# Patient Record
Sex: FEMALE | Race: BLACK OR AFRICAN AMERICAN | NOT HISPANIC OR LATINO | URBAN - METROPOLITAN AREA
[De-identification: names, ages, dates, MRNs, and addresses within clinical notes are randomized per-mention and may not be internally consistent; named-entity substitution may affect disease eponyms.]

---

## 2022-04-03 ENCOUNTER — EMERGENCY (EMERGENCY)
Facility: HOSPITAL | Age: 37
LOS: 1 days | Discharge: HOME | End: 2022-04-03
Attending: EMERGENCY MEDICINE
Payer: COMMERCIAL

## 2022-04-03 VITALS
TEMPERATURE: 98 F | WEIGHT: 240.08 LBS | DIASTOLIC BLOOD PRESSURE: 83 MMHG | HEART RATE: 97 BPM | SYSTOLIC BLOOD PRESSURE: 134 MMHG | RESPIRATION RATE: 20 BRPM | OXYGEN SATURATION: 98 %

## 2022-04-03 LAB
RAPID RVP RESULT: DETECTED
RV+EV RNA SPEC QL NAA+PROBE: DETECTED
SARS-COV-2 RNA SPEC QL NAA+PROBE: SIGNIFICANT CHANGE UP

## 2022-04-03 PROCEDURE — 99284 EMERGENCY DEPT VISIT MOD MDM: CPT

## 2022-04-03 PROCEDURE — 99283 EMERGENCY DEPT VISIT LOW MDM: CPT | Mod: 25

## 2022-04-03 PROCEDURE — 71046 X-RAY EXAM CHEST 2 VIEWS: CPT | Mod: 26

## 2022-04-03 PROCEDURE — 0225U NFCT DS DNA&RNA 21 SARSCOV2: CPT

## 2022-04-03 PROCEDURE — 71046 X-RAY EXAM CHEST 2 VIEWS: CPT

## 2022-04-03 NOTE — ED ADULT NURSE NOTE - NSIMPLEMENTINTERV_GEN_ALL_ED
Implemented All Universal Safety Interventions:  [place] to call system. Call bell, personal items and telephone within reach. Instruct patient to call for assistance. Room bathroom lighting operational. Non-slip footwear when patient is off stretcher. Physically safe environment: no spills, clutter or unnecessary equipment. Stretcher in lowest position, wheels locked, appropriate side rails in place.

## 2022-04-03 NOTE — ED PROVIDER NOTE - PATIENT PORTAL LINK FT
You can access the FollowMyHealth Patient Portal offered by [facility] by registering at the following website: [email or web address]. By joining [email or web address]’s FollowMyHealth portal, you will also be able to view your health information using other applications (apps) compatible with our system. You can access the FollowMyHealth Patient Portal offered by [facility] by registering at the following website: [email or web address]. By joining [employer]’s FollowMyHealth portal, you will also be able to view your health information using other applications (apps) compatible with our system.

## 2022-04-03 NOTE — ED PROVIDER NOTE - OBJECTIVE STATEMENT
36 year old female no past history presents for subjective fevers, chills, productive cough x2 days, worse last night.  Denies cp, sob.

## 2022-04-03 NOTE — ED PROVIDER NOTE - ATTENDING APP SHARED VISIT CONTRIBUTION OF CARE
Patient presents with URI.  Well appearing.  Patient offered CXR, initially declined, then accepted.  Will do RVP, CXR and discharge.  Return precautions provided.

## 2022-04-03 NOTE — ED PROVIDER NOTE - CLINICAL SUMMARY MEDICAL DECISION MAKING FREE TEXT BOX
Patient presents with URI.  Well appearing.  Patient offered CXR but declined.  Will do RVP and discharge.  Return precautions provided. Patient presents with URI.  Well appearing.  Patient offered CXR, initially declined, then accepted.  Will do RVP, CXR and discharge.  Return precautions provided.

## 2022-04-03 NOTE — ED ADULT NURSE NOTE - SUICIDE SCREENING QUESTION 3
CC:  Ivan Gold is here today for follow up on PPI med .  Symptoms:  none  Medications: medications verified and updated  Refills needed today?  NO  denies known Latex allergy or symptoms of Latex sensitivity.  Patient would like communication of their results via:      Cell Phone:   Telephone Information:   Mobile [phone]     Okay to leave a message containing results? Yes    Family History of any GI Disorders:    History of GERD: NO  History of Colon cancer: NO  History of Crohn's: NO  History of Ulcerative Colitis or Crohn's: NO  History of IBS: NO  History of Celiac: NO  History of other GI related illness or cancers such as pancreatic cancer, gallbladder problems, liver disease? NO   No

## 2022-04-03 NOTE — ED ADULT NURSE NOTE - CHIEF COMPLAINT QUOTE
Patient called stating he was having issues with his BP medication  He reported side effects of not being able to breathe, dizziness and a cough that made him feel like he was going to pass out  He said he did not take the medication today and he feels better without it   Patient would like a call back as soon as possible, his call back number is [phone]
productive cough , chills and fever,

## 2022-04-03 NOTE — ED PROVIDER NOTE - NS ED ATTENDING STATEMENT MOD
This was a shared visit with the DINAH. I reviewed and verified the documentation and independently performed the documented:

## 2024-06-15 PROBLEM — Z78.9 OTHER SPECIFIED HEALTH STATUS: Chronic | Status: ACTIVE | Noted: 2022-04-03

## 2024-06-15 PROBLEM — Z00.00 ENCOUNTER FOR PREVENTIVE HEALTH EXAMINATION: Status: ACTIVE | Noted: 2024-06-15

## 2024-06-17 ENCOUNTER — APPOINTMENT (OUTPATIENT)
Dept: SURGERY | Facility: CLINIC | Age: 39
End: 2024-06-17
Payer: COMMERCIAL

## 2024-06-17 VITALS
DIASTOLIC BLOOD PRESSURE: 75 MMHG | BODY MASS INDEX: 41.65 KG/M2 | HEIGHT: 65 IN | WEIGHT: 250 LBS | HEART RATE: 123 BPM | SYSTOLIC BLOOD PRESSURE: 99 MMHG

## 2024-06-17 DIAGNOSIS — R11.2 NAUSEA WITH VOMITING, UNSPECIFIED: ICD-10-CM

## 2024-06-17 DIAGNOSIS — F32.A DEPRESSION, UNSPECIFIED: ICD-10-CM

## 2024-06-17 DIAGNOSIS — Z78.9 OTHER SPECIFIED HEALTH STATUS: ICD-10-CM

## 2024-06-17 DIAGNOSIS — R10.9 UNSPECIFIED ABDOMINAL PAIN: ICD-10-CM

## 2024-06-17 DIAGNOSIS — K82.8 OTHER SPECIFIED DISEASES OF GALLBLADDER: ICD-10-CM

## 2024-06-17 DIAGNOSIS — F41.9 ANXIETY DISORDER, UNSPECIFIED: ICD-10-CM

## 2024-06-17 PROCEDURE — 99203 OFFICE O/P NEW LOW 30 MIN: CPT

## 2024-06-17 RX ORDER — ONDANSETRON 4 MG/1
4 TABLET, ORALLY DISINTEGRATING ORAL EVERY 8 HOURS
Qty: 21 | Refills: 0 | Status: ACTIVE | COMMUNITY
Start: 2024-06-17 | End: 1900-01-01

## 2024-06-17 NOTE — CONSULT LETTER
[Dear  ___] : Dear  [unfilled], [Consult Letter:] : I had the pleasure of evaluating your patient, [unfilled]. [Consult Closing:] : Thank you very much for allowing me to participate in the care of this patient.  If you have any questions, please do not hesitate to contact me. [Sincerely,] : Sincerely, [FreeTextEntry3] : Wes Cruz MD

## 2024-06-17 NOTE — PHYSICAL EXAM
[No HSM] : no hepatosplenomegaly [No Rash or Lesion] : No rash or lesion [Alert] : alert [Oriented to Person] : oriented to person [Oriented to Place] : oriented to place [Oriented to Time] : oriented to time [Calm] : calm [Abdominal Masses] : No abdominal masses [de-identified] : A/Ox3; NAD. appears comfortable [de-identified] : EOMI; sclera anicteric. [de-identified] : no cervical lymphadenopathy  [de-identified] : airway patent, no use of accessory muscles [de-identified] : abd is soft, +tenderness to the upper abdomen  [de-identified] : +ROM, normal gait

## 2024-06-17 NOTE — ASSESSMENT
[FreeTextEntry1] : IMP: 39 yo F who presents w severe abdominal discomfort, a/w nausea and vomiting. Pt w decreased appetite, since last week and last BM, Wednesday.  Abdominal US 06/12-- gallbladder sludge, without evidence of cholelithiasis or acute cholecystitis. Negative Montero's. Labs /LFTs within normal limits.

## 2024-06-17 NOTE — PLAN
[FreeTextEntry1] : Pt's symptoms out of proportion to physical exam /US findings  Will order CT A/P, STAT Follow up after the study   Patient's questions and concerns addressed to their satisfaction, and patient verbalized an understanding of the information discussed.

## 2024-06-17 NOTE — REVIEW OF SYSTEMS
[Abdominal Pain] : abdominal pain [Vomiting] : vomiting [Fever] : no fever [Chills] : no chills [Chest Pain] : no chest pain [Lower Ext Edema] : no lower extremity edema [Shortness Of Breath] : no shortness of breath [Skin Lesions] : no skin lesions [Skin Wound] : no skin wound [Dizziness] : no dizziness [Anxiety] : no anxiety [Muscle Weakness] : no muscle weakness [Swollen Glands] : no swollen glands [FreeTextEntry7] : no BM since last week; and difficulty eating; nausea

## 2024-06-17 NOTE — HISTORY OF PRESENT ILLNESS
[de-identified] : DUSTIN BANEGAS is a 38 year old F w/o significant PMH, who presents w the cc of having severe abdominal discomfort, with nausea and vomiting. Pt states she has not been eating since Monday and last BM was Wednesday, last week. Pt had gone to the ED at [facility], 06/12 and had an abdominal US which had showed gallbladder sludge without evidence of cholelithiasis or acute cholecystitis. Negative Montero's. LFTs/Labs, within normal limits.  Presently she has a lot of discomfort to the upper abdomen. Pt has been experiencing symptoms for some time, but recently has gotten worse.  PSH: 2 C-Sections; R Shoulder and R Knee Surgery

## 2024-06-18 ENCOUNTER — RESULT REVIEW (OUTPATIENT)
Age: 39
End: 2024-06-18

## 2024-06-21 ENCOUNTER — APPOINTMENT (OUTPATIENT)
Dept: CT IMAGING | Facility: CLINIC | Age: 39
End: 2024-06-21
Payer: COMMERCIAL

## 2024-06-21 DIAGNOSIS — K57.92 DIVERTICULITIS OF INTESTINE, PART UNSPECIFIED, W/OUT PERFORATION OR ABSCESS W/OUT BLEEDING: ICD-10-CM

## 2024-06-21 PROCEDURE — 74177 CT ABD & PELVIS W/CONTRAST: CPT

## 2024-06-21 RX ORDER — AMOXICILLIN AND CLAVULANATE POTASSIUM 875; 125 MG/1; MG/1
875-125 TABLET, COATED ORAL
Qty: 20 | Refills: 0 | Status: ACTIVE | COMMUNITY
Start: 2024-06-21 | End: 1900-01-01

## 2024-07-08 ENCOUNTER — APPOINTMENT (OUTPATIENT)
Dept: SURGERY | Facility: CLINIC | Age: 39
End: 2024-07-08

## 2025-01-15 NOTE — ED ADULT TRIAGE NOTE - HEART RATE (BEATS/MIN)
"[facility]  Hospitalist Discharge Summary      Date of Admission:  1/13/2025  Date of Discharge:  1/15/2025 10:47 AM  Discharging Provider: Dinorah Berrios MD  Discharge Service: Hospitalist Service, GOLD TEAM 22    Discharge Diagnoses   Acute alcohol withdrawal  Alcohol use disorder with history of DT  AGMA  Hyponatremia  Recurrent falls  Recent positive blood cx (Staphylococcus epidermidis, micrococcus)    History of COPD  Bipolar disorder  PTSD  Insomnia GERD  Hypothyroidism  Osteoarthritis  Hepatitis, treated    Clinically Significant Risk Factors     # Overweight: Estimated body mass index is 25.69 kg/m  as calculated from the following:    Height as of 1/9/25: 1.6 m (5' 3\").    Weight as of this encounter: 65.8 kg (145 lb).       Follow-ups Needed After Discharge   Follow-up Appointments       Adult [facility] Follow-up and recommended labs and tests      Follow up with intensive inpatient treatment for alcohol and mental health on Monday as already planned.     Appointments on [place] and/or [facility] (with [facility] or [facility] provider or service). Call [phone] if you haven't heard regarding these appointments within 7 days of discharge.              Unresulted Labs Ordered in the Past 30 Days of this Admission       Date and Time Order Name Status Description    1/13/2025  2:56 PM Blood Culture Peripheral Blood Preliminary     1/13/2025  2:56 PM Blood Culture Hand, Right Preliminary             Discharge Disposition   Discharged to home  Condition at discharge: Stable    Hospital Course   Jolynn Rivera is a 63 year old female with medical history significant for alcohol use disorder with prior withdrawal seizures, bipolar disorder, COPD, GERD, hepatitis C s/p treatment, chronic bilateral back and hip pain admitted on 1/13/2025 for alcohol withdrawal. Presented w/ LEOBARDO 0.197. Last drink ~8H prior to admission. Drinking about 1.5L wine per day. H/o alcohol " withdrawal seizures, but no seizure activity on presentation.  Labs were notable for anion gap metabolic acidosis and hyponatremia likely secondary to poor p.o. intake, improved after hydration.  She was placed on CIWA protocol with diazepam.  At the time of discharge she denied any chest pain, shortness of breath, abdominal pain, nausea, vomiting, tremors.  She was ambulating without concerns.  -Continue online treatment program  -Continue plans for inpatient treatment starting on Monday as already established  -Continue all prior to admission medications      Recurrent falls  Noted during 1/9 encounter. Head CT at the time normal. No recent falls reported by patient.  Seen by PT inpatient, felt to be at baseline.  Safe to discharge home.     Recent positive blood cx (Staphylococcus epidermidis, micrococcus), likely a contaminant  Seen in ED on 1/9 for alcohol withdrawal and alcohol withdrawal seizure. Blood cx drawn positive on day 1 of incubation w/ Staph epi and micrococcus species in 1 of 2 bottles. Likely contaminant. No infectious symptoms this admission.  Patient afebrile and hemodynamically stable.  Repeat blood cultures 01/13 no growth to date.  -Follow-up repeat 01/13 blood cultures, no growth after 12 hours     History of COPD  - Continue PTA inhalers ordered as fluticasone-vilanterol and umeclidinium     Bipolar disorder  PTSD  Insomnia  - PTA propranolol 20 mg TID  - PTA gabapentin 900 mg TID  - PTA Lunesta 3 mg nightly     GERD: PTA omeprazole  Hypothyroidism: PTA levothyroxine 100mcg daily  Osteoarthritis of multiple joints: PTA gabapentin helps, PRN apap.  Follow-up with primary care doctor for further evaluation if needed.  Hepatitis, treated.    Consultations This Hospital Stay   CARE MANAGEMENT / SOCIAL WORK IP CONSULT  PHYSICAL THERAPY ADULT IP CONSULT  CARE MANAGEMENT / SOCIAL WORK IP CONSULT  WOUND OSTOMY CONTINENCE NURSE  IP CONSULT    Code Status   Prior    Time Spent on this Encounter   I,  Dinorah Berrios MD, personally saw the patient today and spent greater than 30 minutes discharging this patient.       Dinorah Berrios MD  [facility] MED SURG  [address] [address]  Phone: [phone]  Fax: [phone]  ______________________________________________________________________    Physical Exam   Vital Signs: Temp: 97.8  F (36.6  C) Temp src: Oral BP: 116/77 Pulse: 71   Resp: 18 SpO2: 95 % O2 Device: None (Room air)    Weight: 145 lbs 0 oz    General Appearance:  Awake, Alert, Oriented, No acute distress, Nontoxic appearing   Respiratory: Breathing comfortably on room air. Lungs are clear to auscultation bilaterally.    Cardiovascular: Regular rate and rhythm, extremities perfused.   GI: Normal bowel sounds, Soft, nontender, nondistended  Extremities: No lower extremity edema.  No tremors.  Neurology: AOX3, moving all extremities appropriately.       Primary Care Physician   Carmela Srinivasan MD    Discharge Orders      Reason for your hospital stay    You were in the hospital for alcohol withdrawal.  You are feeling much better and are ready to go home.     Activity    Your activity upon discharge: activity as tolerated     Adult [facility] Follow-up and recommended labs and tests    Follow up with intensive inpatient treatment for alcohol and mental health on Monday as already planned.     Appointments on [place] and/or [facility] (with [facility] or [facility] provider or service). Call [phone] if you haven't heard regarding these appointments within 7 days of discharge.     Diet    Follow this diet upon discharge: Current Diet:Orders Placed This Encounter      Combination Diet Regular Diet Adult       Significant Results and Procedures   Most Recent 3 CBC's:  Recent Labs   Lab Test 01/14/25  0537 01/13/25  1506 01/09/25  0203   WBC 5.4 5.5 5.7   HGB 12.7 15.1 14.9   MCV 97 96 92    239 195     Most Recent 3 BMP's:  Recent Labs   Lab Test 01/14/25  0537  01/13/25  1506 01/09/25  0730   * 137 132*   POTASSIUM 3.6 4.1 3.5   CHLORIDE 96* 96* 91*   CO2 25 19* 23   BUN 8.8 5.0* 5.1*   CR 0.57 0.51 0.49*   ANIONGAP 13 22* 18*   ELVIRA 8.3* 8.8 8.5*   * 113* 166*     Most Recent 2 LFT's:  Recent Labs   Lab Test 01/13/25  1506 01/09/25  0203   AST 35 37   ALT 24 27   ALKPHOS 59 57   BILITOTAL 0.3 0.4     Most Recent 3 INR's:  Recent Labs   Lab Test 11/17/23  2133   INR 0.97     Most Recent INR's and Anticoagulation Dosing History:  Anticoagulation Dose History  More data may exist         Latest Ref Rng & Units 7/9/2013 10/22/2014 10/24/2014 12/2/2014 5/14/2015 9/3/2016 11/17/2023   Recent Dosing and Labs   INR 0.85 - 1.15 0.96  0.95  0.94  0.97  1.06  1.00  0.97      Most Recent 3 Creatinines:  Recent Labs   Lab Test 01/14/25  0537 01/13/25  1506 01/09/25  0730   CR 0.57 0.51 0.49*     Most Recent 3 Hemoglobins:  Recent Labs   Lab Test 01/14/25  0537 01/13/25  1506 01/09/25  0203   HGB 12.7 15.1 14.9     Most Recent 3 Troponin's:No lab results found.  Most Recent 3 BNP's:No lab results found.  Most Recent D-dimer:  Recent Labs   Lab Test 12/02/24  1315   DD 1.14*     Most Recent Cholesterol Panel:  Recent Labs   Lab Test 04/05/24  0749   CHOL 175   LDL 54      TRIG 65     7-Day Micro Results       Collected Updated Procedure Result Status      01/13/2025 1506 01/15/2025 1731 Blood Culture Hand, Right [[id number]]   Blood from Hand, Right    Preliminary result Component Value   Culture No growth after 2 days  [P]                01/13/2025 1506 01/15/2025 1731 Blood Culture Peripheral Blood [[id number]]   Peripheral Blood    Preliminary result Component Value   Culture No growth after 2 days  [P]                01/09/2025 0645 01/13/2025 0823 Blood Culture Peripheral Blood [[id number]]   (Abnormal)   Peripheral Blood    Final result Component Value   Culture Positive on the 1st day of incubation    Staphylococcus epidermidis    1 of 2 bottles  The  recovery of this organism from a single blood culture bottle most likely represents contamination. If susceptibility testing is needed, please refer to the antibiogram or contact IDDL. If contamination is suspected, it is important to repeat two sets of blood cultures from two different sites.    Micrococcus species    1 of 2 bottles  The recovery of this organism from a single blood culture bottle most likely represents contamination. If susceptibility testing is needed, please refer to the antibiogram or contact IDDL. If contamination is suspected, it is important to repeat two sets of blood cultures from two different sites.    Micrococcus species    1 of 2 bottles  The recovery of this organism from a single blood culture bottle most likely represents contamination. If susceptibility testing is needed, please refer to the antibiogram or contact IDDL. If contamination is suspected, it is important to repeat two sets of blood cultures from two different sites.               01/09/2025 0645 01/10/2025 0908 [facility] GP Panel [[id number]]    (Abnormal)   Peripheral Blood    Final result Component Value   Staphylococcus aureus Not Detected   Staphylococcus epidermidis Detected   Positive for Staphylococcus epidermidis and negative for the mecA gene (not resistant to methicillin) by [facility] multiplex nucleic acid test. Final identification and antimicrobial susceptibility testing will be verified by standard methods.   Staphylococcus lugdunensis Not Detected   Enterococcus faecalis Not Detected   Enterococcus faecium Not Detected   Streptococcus species Not Detected   Streptococcus agalactiae Not Detected   Streptococcus anginosus group Not Detected   Streptococcus pneumoniae Not Detected   Streptococcus pyogenes Not Detected   Listeria species Not Detected            01/09/2025 0513 01/14/2025 0646 Blood Culture Peripheral Blood [[id number]]   Peripheral Blood    Final result Component Value   Culture No Growth                      Most Recent TSH and T4:  Recent Labs   Lab Test 12/02/24  1709 12/02/24  1315   TSH  --  0.12*   T4 1.49 1.47     Most Recent Hemoglobin A1c:  Recent Labs   Lab Test 04/04/24  1615   A1C 5.9*     Most Recent 6 glucoses:  Recent Labs   Lab Test 01/14/25  0537 01/13/25  1506 01/09/25  0730 01/09/25  0203 12/03/24  0332 12/02/24  1502   * 113* 166* 110* 190* 178*     Most Recent Urinalysis:  Recent Labs   Lab Test 01/09/25  0536   COLOR Light Yellow   APPEARANCE Clear   URINEGLC Negative   URINEBILI Negative   URINEKETONE 10*   SG 1.009   UBLD Small*   URINEPH 6.0   PROTEIN 20*   NITRITE Negative   LEUKEST Negative   RBCU <1   WBCU 0     Most Recent ABG:No lab results found.  Most Recent ESR & CRP:No lab results found.  Most Recent Anemia Panel:  Recent Labs   Lab Test 01/14/25  0537 12/09/22  1356 10/08/22  0528   WBC 5.4   < > 3.6*   HGB 12.7   < > 13.1   HCT 35.1   < > 37.7   MCV 97   < > 97      < > 145*   B12  --   --  476   FOLIC  --   --  18.4    < > = values in this interval not displayed.     Most Recent CPK:  Recent Labs   Lab Test 01/09/25  0203   *   ,   Results for orders placed or performed during the hospital encounter of 01/09/25   Head CT w/o contrast    Narrative    EXAM: CT HEAD W/O CONTRAST  LOCATION: [facility]  DATE: 1/9/2025    INDICATION: seizures, fall, etoh  COMPARISON:  CT head December 2, 2024.  TECHNIQUE: Routine CT Head without IV contrast. Multiplanar reformats. Dose reduction techniques were used.    FINDINGS:  Motion degrades evaluation.  INTRACRANIAL CONTENTS: No intracranial hemorrhage, extraaxial collection, or mass effect.  No CT evidence of acute infarct. Normal parenchymal attenuation. Mild generalized volume loss. No hydrocephalus.     VISUALIZED ORBITS/SINUSES/MASTOIDS: No intraorbital abnormality. No paranasal sinus mucosal disease. No middle ear or mastoid effusion.    BONES/SOFT TISSUES: No  acute abnormality.      Impression    IMPRESSION:  1.  No acute intracranial process.   XR Chest 2 Views    Narrative    EXAM: XR CHEST 2 VIEWS  LOCATION: [facility]  DATE: 1/9/2025    INDICATION: sepsis workup  COMPARISON: 12/2/2024.      Impression    IMPRESSION: There heart is normal in size. Subsegmental right infrahilar atelectasis is noted, the lungs are otherwise clear.       Discharge Medications   Discharge Medication List as of 1/15/2025 10:21 AM        CONTINUE these medications which have NOT CHANGED    Details   eszopiclone (LUNESTA) 3 MG tablet Take 3 mg by mouth at bedtime, Historical      Fluticasone-Umeclidin-Vilanterol (TRELEGY ELLIPTA) 200-62.5-25 MCG/ACT oral inhaler Inhale 1 puff into the lungs daily. Has not started yet., Historical      gabapentin (NEURONTIN) 600 MG tablet Take 900 mg by mouth 3 times daily, Historical      levalbuterol (XOPENEX HFA) 45 MCG/ACT inhaler Inhale 2 puffs into the lungs every 4 hours as needed for shortness of breath or wheezing. Has not started yet., Historical      levothyroxine (SYNTHROID/LEVOTHROID) 100 MCG tablet Take 100 mcg by mouth every morning, Historical      melatonin 5 MG tablet Take 10 mg by mouth at bedtime Takes 1 hour prior to Lunesta, Historical      multivitamin w/minerals (THERA-VIT-M) tablet Take 1 tablet by mouth daily, Disp-30 tablet, R-0, E-PrescribePt has supply      nicotine polacrilex (NICORETTE) 4 MG gum Place 4 mg inside cheek every hour as needed for smoking cessation, Historical      omeprazole (PRILOSEC) 40 MG DR capsule Take 40 mg by mouth every morning, Historical      ondansetron (ZOFRAN ODT) 4 MG ODT tab Take 4 mg by mouth every 8 hours as needed for vomiting or nausea, Historical      propranolol (INDERAL) 20 MG tablet Take 20 mg by mouth 3 times daily, Historical      thiamine (B-1) 100 MG tablet Take 1 tablet (100 mg) by mouth daily, Disp-30 tablet, R-0, E-Prescribe     "  levofloxacin (LEVAQUIN) 500 MG tablet Take 500 mg by mouth daily. Start FEB 1ST 2025., Historical           Allergies   Allergies   Allergen Reactions    Bee Venom Swelling    Wasps [Hornets] Swelling    Amlodipine      Nightmares    Antihistamines, Chlorpheniramine-Type [Antihistamines, Chlorpheniramine-Type]     Clonidine     Compazine      Lock jaw    Diphenhydramine Muscle Pain (Myalgia) and Cramps    Hydroxyzine Cramps     Lock jaw    Metoclopramide      Tardive Dyskinesia    Penicillins      Panic attack    Prednisone Anxiety     Panic attacks.      Prochlorperazine Muscle Pain (Myalgia) and Cramps    Trazodone Nausea and Vomiting and Confusion     \"I ended up falling and feeling like I was drunk\"    Umeclidinium Bromide      Mood swings + anger.     " 97